# Patient Record
Sex: MALE | Race: BLACK OR AFRICAN AMERICAN | NOT HISPANIC OR LATINO | URBAN - METROPOLITAN AREA
[De-identification: names, ages, dates, MRNs, and addresses within clinical notes are randomized per-mention and may not be internally consistent; named-entity substitution may affect disease eponyms.]

---

## 2020-02-19 ENCOUNTER — EMERGENCY (EMERGENCY)
Facility: HOSPITAL | Age: 25
LOS: 1 days | Discharge: HOME | End: 2020-02-19
Admitting: EMERGENCY MEDICINE
Payer: MEDICAID

## 2020-02-19 VITALS
TEMPERATURE: 98 F | OXYGEN SATURATION: 100 % | SYSTOLIC BLOOD PRESSURE: 145 MMHG | DIASTOLIC BLOOD PRESSURE: 95 MMHG | RESPIRATION RATE: 16 BRPM | HEART RATE: 55 BPM

## 2020-02-19 PROCEDURE — 99284 EMERGENCY DEPT VISIT MOD MDM: CPT

## 2020-02-19 PROCEDURE — 70486 CT MAXILLOFACIAL W/O DYE: CPT | Mod: 26

## 2020-02-19 PROCEDURE — 70450 CT HEAD/BRAIN W/O DYE: CPT | Mod: 26

## 2020-02-19 PROCEDURE — 73130 X-RAY EXAM OF HAND: CPT | Mod: 26,RT

## 2020-02-19 RX ORDER — CEPHALEXIN 500 MG
500 CAPSULE ORAL EVERY 12 HOURS
Refills: 0 | Status: DISCONTINUED | OUTPATIENT
Start: 2020-02-19 | End: 2020-03-04

## 2020-02-19 RX ORDER — OXYCODONE HYDROCHLORIDE 5 MG/1
5 TABLET ORAL ONCE
Refills: 0 | Status: DISCONTINUED | OUTPATIENT
Start: 2020-02-19 | End: 2020-02-19

## 2020-02-19 RX ORDER — CEPHALEXIN 500 MG
1 CAPSULE ORAL
Qty: 28 | Refills: 0
Start: 2020-02-19 | End: 2020-02-25

## 2020-02-19 RX ORDER — ACETAMINOPHEN 500 MG
975 TABLET ORAL ONCE
Refills: 0 | Status: COMPLETED | OUTPATIENT
Start: 2020-02-19 | End: 2020-02-19

## 2020-02-19 RX ORDER — TETANUS TOXOID, REDUCED DIPHTHERIA TOXOID AND ACELLULAR PERTUSSIS VACCINE, ADSORBED 5; 2.5; 8; 8; 2.5 [IU]/.5ML; [IU]/.5ML; UG/.5ML; UG/.5ML; UG/.5ML
0.5 SUSPENSION INTRAMUSCULAR ONCE
Refills: 0 | Status: COMPLETED | OUTPATIENT
Start: 2020-02-19 | End: 2020-02-19

## 2020-02-19 RX ADMIN — Medication 975 MILLIGRAM(S): at 03:11

## 2020-02-19 RX ADMIN — TETANUS TOXOID, REDUCED DIPHTHERIA TOXOID AND ACELLULAR PERTUSSIS VACCINE, ADSORBED 0.5 MILLILITER(S): 5; 2.5; 8; 8; 2.5 SUSPENSION INTRAMUSCULAR at 03:11

## 2020-02-19 RX ADMIN — Medication 500 MILLIGRAM(S): at 04:29

## 2020-02-19 RX ADMIN — OXYCODONE HYDROCHLORIDE 5 MILLIGRAM(S): 5 TABLET ORAL at 04:10

## 2020-02-19 RX ADMIN — OXYCODONE HYDROCHLORIDE 5 MILLIGRAM(S): 5 TABLET ORAL at 03:10

## 2020-02-19 RX ADMIN — Medication 975 MILLIGRAM(S): at 03:32

## 2020-02-19 NOTE — ED PROVIDER NOTE - MUSCULOSKELETAL, MLM
Spine appears normal, range of motion is not limited, no muscle or joint tenderness. No bruising or swelling. No snuff box tenderness. Full rom of all joints. +linear laceration across entire right palm from base of right thumb to medial hand. No bony tenderness.

## 2020-02-19 NOTE — ED PROVIDER NOTE - NSFOLLOWUPINSTRUCTIONS_ED_ALL_ED_FT
Follow up with Hand Surgeon Dr. Dimas this week ent      Have sutures removed in about 7- 10 days  Take Motrin 600mg every 8hrs with food for pain.   Take Tylenol 650mg (Two 325 mg pills) every 4-6 hours as needed for pain.    Worsening, continued or new concerning symptoms return to the emergency department.

## 2020-02-19 NOTE — ED PROVIDER NOTE - ENMT, MLM
Airway patent, Nasal mucosa clear. Mouth with normal mucosa. Throat has no vesicles, no oropharyngeal exudates and uvula is midline. Head holocephalic. +abrasion noted to right maxilla w/ swelling. No tenderness. No bruising.

## 2020-02-19 NOTE — ED PROVIDER NOTE - PATIENT PORTAL LINK FT
You can access the FollowMyHealth Patient Portal offered by [facility] by registering at the following website: [email or web address]. By joining [facility]’s FollowMyHealth portal, you will also be able to view your health information using other applications (apps) compatible with our system.

## 2020-02-19 NOTE — ED ADULT NURSE NOTE - OBJECTIVE STATEMENT
Patient alert and oriented x3 . Pt stated that he tripped and fell down the stairs and hit his hand on the metal mop which he was holding. patient stated that he has laceration to right palm of the hand. Dressing dry and intact. No active bleeding noted. Denies hitting the head. Meds given as ordered.

## 2020-02-19 NOTE — ED PROVIDER NOTE - OBJECTIVE STATEMENT
23 y/o male with no pmhx presents to ED c/o mechanical fall today. Slipped and fell down 3 stairs. Pt fell forward and hit his face on stairs and right dominant hand on to a "metal broom." Last tetanus unknown. No LOC. No blood thinners. Pt c/o right palm pain with laceration and bleeding. Pt c/o mild facial pain. No headache. No cp, sob, abd pain, n/v, weakness, numbness, neck pain, dizziness, tingling.

## 2020-02-19 NOTE — CONSULT NOTE ADULT - SUBJECTIVE AND OBJECTIVE BOX
24y Male presents s/p mechanical fall onto right hand down 3 stairs while holding a broom. Pt reports the broom lacerated his palm. Reports pain with movement of the hand afterward. Denies numbness/tingling of the affected extremity. No other bone or joint complaints. Also Hit his face when he landed so is pending a CT maxfacial. XRs hand negative     PAST MEDICAL & SURGICAL HISTORY:    MEDICATIONS  (STANDING):    MEDICATIONS  (PRN):    No Known Allergies      Physical Exam  T(C): 36.6 (02-19-20 @ 00:43), Max: 36.6 (02-19-20 @ 00:43)  HR: 55 (02-19-20 @ 00:43) (55 - 55)  BP: 145/95 (02-19-20 @ 00:43) (145/95 - 145/95)  RR: 16 (02-19-20 @ 00:43) (16 - 16)  SpO2: 100% (02-19-20 @ 00:43) (100% - 100%)  Wt(kg): --    Gen: NAD  RUE:   6cm superficial laceration extending across midpalm  No bone or tendon visible  AIN/PIN/U intact  SILT over all digits radial and ulnar side  Brisk cap refill in all digits  No rotational malalignment of digits     Imaging  X-ray: no fracture/discloation    Procedure: 10cc 1% lido w/epi administered around borders of wound, wound irrigated copiously with water follow by betadine. Hand steriley prepped with betadine and OR towel. Incision closed with several 4-0 nylons in a horizontal mattress configuration. Post procedure patient was NVI in all digits, wound well approximated, bleeding controlled. Xeroform placed over incision followed by a bulky soft dressing covered with ACE.       A/P: 24y Male s/p closure of hand laceration   - pain control  - elevate affected extremity  - Tetanus in ED complete  - DC with 7 days Keflex   - Keep dressing c/d/i  - FU with Dr. Wiggins this week, call office to schedule an appointment [phone]   - Return to ER if any severe pain/numbness develops  - Patient understands all of above.

## 2020-02-19 NOTE — ED PROVIDER NOTE - CLINICAL SUMMARY MEDICAL DECISION MAKING FREE TEXT BOX
23 y/o male with no pmhx presents to ED c/o mechanical fall today. Slipped and fell down 3 stairs. Pt fell forward and hit his face on stairs and right dominant hand on to a "metal broom." Last tetanus unknown. No LOC. No blood thinners. Pt c/o right palm pain with laceration and bleeding. +head trauma, pt c/o facial pain. plan to check hand XR r/o fracture, update tetanus, hand consult for laceration repair, CT.

## 2020-02-19 NOTE — ED ADULT NURSE NOTE - NSIMPLEMENTINTERV_GEN_ALL_ED
Implemented All Fall Risk Interventions:  [place] to call system. Call bell, personal items and telephone within reach. Instruct patient to call for assistance. Room bathroom lighting operational. Non-slip footwear when patient is off stretcher. Physically safe environment: no spills, clutter or unnecessary equipment. Stretcher in lowest position, wheels locked, appropriate side rails in place. Provide visual cue, wrist band, yellow gown, etc. Monitor gait and stability. Monitor for mental status changes and reorient to person, place, and time. Review medications for side effects contributing to fall risk. Reinforce activity limits and safety measures with patient and family.

## 2020-02-19 NOTE — ED ADULT TRIAGE NOTE - CHIEF COMPLAINT QUOTE
Brought to ED by EMS for right hand laceration.  Patient tripped and fall down 2-3 steps and cut hand on metal mop he was holding.  No LOC or trauma to head.  Denies any s/sx at this time.  Laceration to palm of right hand, bleeding controlled and dressing applied by EMS.  No significant medical history.

## 2020-02-20 PROBLEM — Z00.00 ENCOUNTER FOR PREVENTIVE HEALTH EXAMINATION: Status: ACTIVE | Noted: 2020-02-20

## 2020-02-26 ENCOUNTER — APPOINTMENT (OUTPATIENT)
Age: 25
End: 2020-02-26
Payer: MEDICAID

## 2020-02-26 VITALS
BODY MASS INDEX: 33.13 KG/M2 | DIASTOLIC BLOOD PRESSURE: 82 MMHG | SYSTOLIC BLOOD PRESSURE: 130 MMHG | WEIGHT: 180 LBS | HEART RATE: 66 BPM | HEIGHT: 62 IN

## 2020-02-26 DIAGNOSIS — Z78.9 OTHER SPECIFIED HEALTH STATUS: ICD-10-CM

## 2020-02-26 DIAGNOSIS — Z56.0 UNEMPLOYMENT, UNSPECIFIED: ICD-10-CM

## 2020-02-26 DIAGNOSIS — Z72.3 LACK OF PHYSICAL EXERCISE: ICD-10-CM

## 2020-02-26 PROCEDURE — 99204 OFFICE O/P NEW MOD 45 MIN: CPT

## 2020-02-26 SDOH — ECONOMIC STABILITY - INCOME SECURITY: UNEMPLOYMENT, UNSPECIFIED: Z56.0

## 2020-02-26 NOTE — ASSESSMENT
[FreeTextEntry1] : 24 year old male presents with right palm laceration and no deficits. Prescription provided today for hand therapy. Return appointment will be scheduled for one week for suture removal

## 2020-02-26 NOTE — HISTORY OF PRESENT ILLNESS
[Right] : right hand dominant [FreeTextEntry1] : He presents today for evaluation of his right palm laceration\par Date of injury: 2/19/20\par Method of injury: fall from shower\par \par Patient fell getting out of shower and cut palm of right hand open. Patient went to ED immediately where he received x-rays which showed no fracture of the right hand. Patient received 7 stitches. Patient reports no loss of sensation and no pain beyond the wound site. Patient has full ROM of his extremities. He describes the pain as 5/10. The pain is characterized as sharp in nature. The pain is characterized as throbbing in nature. He has been taking over the counter tylenol to help improve the pain.

## 2020-02-26 NOTE — PHYSICAL EXAM
[de-identified] : Constitutional: Alert and in no acute distress.\par Psychiatric: Oriented to person, place, and time. Insight and judgement were intact and the affect was normal.\par Cardiovascular: Regular rate assessed through peripheral pulses.\par Pulmonary: Nonlabored breathing on room air.\par Lymphatics: No peripheral lymphadenopathy appreciated.\par \par Musculoskeletal:\par \par Cervical spine mobility is full in all directions. \par \par No skin changes are noted to the upper extremities. Volar zone IV laceration across the palm. Muscle bulk and contour are within normal limits without evidence of atrophy.\par \par Mobility is full about the elbows with flexion and extension. Forearm supination measures 85/85 degrees. Forearm pronation measures 85/85 degrees. Wrist flexion measured 75/75 degrees. Wrist extension measures 65/65 degrees. Digital flexion and extension is full. 5/5 FDP and FDS to all digits. Thumb opposition is full to the base of the small fingers bilaterally. Thumb abduction measures 5/5 in strength. Interosseous abduction measures 5/5 in strength. SILT RDN/UDN all digits without deficit.\par \par Sensation is intact to light touch in the ulnar, median, and radial nerve distributions. Carpal tunnel provocative maneuvers are negative. Cubital tunnel provocative maneuvers are negative. Fingers are pink and well perfused. Capillary refill is brisk.\par   [de-identified] : Outside imaging reviewed showing no foreign body or cortical disruption

## 2020-03-04 ENCOUNTER — APPOINTMENT (OUTPATIENT)
Dept: ORTHOPEDIC SURGERY | Facility: CLINIC | Age: 25
End: 2020-03-04
Payer: MEDICAID

## 2020-03-04 VITALS
HEART RATE: 67 BPM | SYSTOLIC BLOOD PRESSURE: 121 MMHG | WEIGHT: 180 LBS | BODY MASS INDEX: 28.25 KG/M2 | HEIGHT: 67 IN | DIASTOLIC BLOOD PRESSURE: 84 MMHG

## 2020-03-04 DIAGNOSIS — S61.411D LACERATION W/OUT FOREIGN BODY OF RIGHT HAND, SUBSEQUENT ENCOUNTER: ICD-10-CM

## 2020-03-04 PROBLEM — Z78.9 DOES NOT USE ILLICIT DRUGS: Status: ACTIVE | Noted: 2020-02-26

## 2020-03-04 PROBLEM — Z72.3 DOES NOT EXERCISE: Status: ACTIVE | Noted: 2020-02-26

## 2020-03-04 PROBLEM — Z78.9 SOCIAL ALCOHOL USE: Status: ACTIVE | Noted: 2020-02-26

## 2020-03-04 PROBLEM — Z78.9 NO PERTINENT PAST MEDICAL HISTORY: Status: RESOLVED | Noted: 2020-02-26 | Resolved: 2020-03-04

## 2020-03-04 PROBLEM — Z56.0 UNEMPLOYED: Status: ACTIVE | Noted: 2020-02-26

## 2020-03-04 PROBLEM — Z78.9 NON-SMOKER: Status: ACTIVE | Noted: 2020-02-26

## 2020-03-04 PROCEDURE — 99214 OFFICE O/P EST MOD 30 MIN: CPT

## 2020-03-04 RX ORDER — IBUPROFEN 800 MG/1
TABLET, FILM COATED ORAL
Refills: 0 | Status: ACTIVE | COMMUNITY

## 2021-05-30 ENCOUNTER — OUTPATIENT (OUTPATIENT)
Dept: OUTPATIENT SERVICES | Facility: HOSPITAL | Age: 26
LOS: 1 days | End: 2021-05-30

## 2021-05-30 DIAGNOSIS — Z20.822 CONTACT WITH AND (SUSPECTED) EXPOSURE TO COVID-19: ICD-10-CM

## 2021-05-31 LAB — SARS-COV-2 RNA SPEC QL NAA+PROBE: SIGNIFICANT CHANGE UP
